# Patient Record
Sex: FEMALE | Race: WHITE | NOT HISPANIC OR LATINO | Employment: OTHER | URBAN - NONMETROPOLITAN AREA
[De-identification: names, ages, dates, MRNs, and addresses within clinical notes are randomized per-mention and may not be internally consistent; named-entity substitution may affect disease eponyms.]

---

## 2023-10-16 ENCOUNTER — OFFICE VISIT (OUTPATIENT)
Dept: URGENT CARE | Facility: CLINIC | Age: 43
End: 2023-10-16
Payer: COMMERCIAL

## 2023-10-16 ENCOUNTER — APPOINTMENT (OUTPATIENT)
Dept: RADIOLOGY | Facility: CLINIC | Age: 43
End: 2023-10-16
Payer: COMMERCIAL

## 2023-10-16 VITALS — RESPIRATION RATE: 18 BRPM | OXYGEN SATURATION: 96 % | TEMPERATURE: 98.8 F | HEART RATE: 101 BPM

## 2023-10-16 DIAGNOSIS — M79.672 LEFT FOOT PAIN: ICD-10-CM

## 2023-10-16 DIAGNOSIS — S90.32XA CONTUSION OF LEFT FOOT, INITIAL ENCOUNTER: Primary | ICD-10-CM

## 2023-10-16 PROCEDURE — 99213 OFFICE O/P EST LOW 20 MIN: CPT | Performed by: PHYSICIAN ASSISTANT

## 2023-10-16 PROCEDURE — 73630 X-RAY EXAM OF FOOT: CPT

## 2023-10-16 RX ORDER — ATORVASTATIN CALCIUM 40 MG/1
1 TABLET, FILM COATED ORAL EVERY EVENING
COMMUNITY
Start: 2023-08-30 | End: 2023-12-28

## 2023-10-16 RX ORDER — LEVOTHYROXINE SODIUM 112 UG/1
TABLET ORAL
COMMUNITY
Start: 2023-08-03

## 2023-10-16 RX ORDER — CLONAZEPAM 1 MG/1
TABLET ORAL
COMMUNITY
Start: 2023-09-01

## 2023-10-16 RX ORDER — ARIPIPRAZOLE 10 MG/1
TABLET ORAL
COMMUNITY
Start: 2023-08-30

## 2023-10-16 RX ORDER — HALOPERIDOL 5 MG/1
TABLET ORAL
COMMUNITY
Start: 2023-08-30

## 2023-10-16 RX ORDER — PANTOPRAZOLE SODIUM 40 MG/1
TABLET, DELAYED RELEASE ORAL
COMMUNITY
Start: 2023-05-25

## 2023-10-16 RX ORDER — PALIPERIDONE PALMITATE 819 MG/2.625ML
INJECTION, SUSPENSION, EXTENDED RELEASE INTRAMUSCULAR
COMMUNITY
Start: 2023-07-28

## 2023-10-16 RX ORDER — GABAPENTIN 300 MG/1
CAPSULE ORAL
COMMUNITY
Start: 2023-09-29

## 2023-10-16 RX ORDER — ASPIRIN 81 MG/1
81 TABLET ORAL DAILY
COMMUNITY

## 2023-10-16 NOTE — PROGRESS NOTES
[facility]        NAME: Samantha Ty is a 39 y.o. female  : 1980    MRN: [id number]  DATE: 2023  TIME: 11:50 AM    Assessment and Plan   Contusion of left foot, initial encounter [S90.32XA]  1. Contusion of left foot, initial encounter  XR foot 3+ vw left        XR provider read: no acute fracture or dislocation    Patient Instructions     Tylenol/Ibuprofen for pain  Wear brace, splint or ACE wrap +/- crutches for support (Remove braces and ACE bandages every 3 hours)  Wear shoe arch support for foot injuries (ex: superfeet insoles)  Ice 20 minutes 3-4 times per day for 3 days  Insulate the skin from the ice to prevent frostbite  Rest and Elevate  Follow up with orthopedic if symptoms do not improve  Follow up with PCP in 3-5 days. Proceed to  ER if symptoms worsen. Remove splint/brace and go straight to ER if you experience sudden increase in pain, swelling, change in color/temperature/sensation, chest pain, shortness or breath, or if you start coughing up blood. Chief Complaint     Chief Complaint   Patient presents with    Pain     Pain swelling with ecchymoses just proximal to left first and second toes onset 3 days ago does not recall what happened         History of Present Illness       Denies known sleep walking. No prior injury to L foot. Ankle Injury   The injury mechanism is unknown. Pain location: L foot. The quality of the pain is described as aching. The pain is moderate. Pertinent negatives include no numbness or tingling. The symptoms are aggravated by palpation. She has tried nothing for the symptoms. Review of Systems   Review of Systems   Musculoskeletal:  Negative for joint swelling. Skin:  Positive for color change. Neurological:  Negative for tingling, weakness and numbness.          Current Medications       Current Outpatient Medications:     ARIPiprazole (ABILIFY) 10 mg tablet, , Disp: , Rfl:     atorvastatin (LIPITOR) 40 mg tablet, Take 1 tablet by mouth every evening, Disp: , Rfl:     Cholecalciferol 25 MCG (1000 UT) tablet, Take 2,000 Units by mouth daily, Disp: , Rfl:     clonazePAM (KlonoPIN) 1 mg tablet, , Disp: , Rfl:     gabapentin (NEURONTIN) 300 mg capsule, Take by mouth, Disp: , Rfl:     haloperidol (HALDOL) 5 mg tablet, , Disp: , Rfl:     Insulin Glargine w/ Trans Port 100 UNIT/ML SOPN, Inject 35 Units under the skin, Disp: , Rfl:     Invega Trinza 819 MG/2.63ML DANY, , Disp: , Rfl:     levothyroxine 112 mcg tablet, Take by mouth, Disp: , Rfl:     metFORMIN (GLUCOPHAGE) 1000 MG tablet, , Disp: , Rfl:     Multiple Vitamins-Minerals (Womens Multivitamin) TABS, Take by mouth, Disp: , Rfl:     pantoprazole (PROTONIX) 40 mg tablet, Take by mouth, Disp: , Rfl:     aspirin (ECOTRIN LOW STRENGTH) 81 mg EC tablet, Take 81 mg by mouth daily, Disp: , Rfl:     Current Allergies     Allergies as of 10/16/2023    (No Known Allergies)            The following portions of the patient's history were reviewed and updated as appropriate: allergies, current medications, past family history, past medical history, past social history, past surgical history and problem list.     Past Medical History:   Diagnosis Date    Anxiety     Depression     Diabetes mellitus ([address])     Disease of thyroid gland        Past Surgical History:   Procedure Laterality Date    TONSILLECTOMY         No family history on file. Medications have been verified. Objective   Pulse 101   Temp 98.8 °F (37.1 °C)   Resp 18   SpO2 96%   No LMP recorded. Physical Exam     Physical Exam  Vitals reviewed. Constitutional:       General: She is not in acute distress. Appearance: She is well-developed. Cardiovascular:      Rate and Rhythm: Normal rate and regular rhythm. Heart sounds: Normal heart sounds. No murmur heard. No friction rub. No gallop. Pulmonary:      Effort: Pulmonary effort is normal. No respiratory distress.       Breath sounds: Normal breath sounds. No wheezing, rhonchi or rales. Musculoskeletal:         General: Tenderness present. No swelling or deformity. Normal range of motion. Comments: L 1-5th proximal to distal metatarsals TTP. Skin:     General: Skin is warm. Findings: Bruising present. No erythema. Comments: Contusion to L 1st-3rd distal metatarsals    Neurological:      Mental Status: She is alert and oriented to person, place, and time. Deep Tendon Reflexes: Reflexes are normal and symmetric. Psychiatric:         Behavior: Behavior normal.         Thought Content:  Thought content normal.         Judgment: Judgment normal.

## 2023-10-16 NOTE — PATIENT INSTRUCTIONS
Tylenol/Ibuprofen for pain  Wear brace, splint or ACE wrap +/- crutches for support (Remove braces and ACE bandages every 3 hours)  Wear shoe arch support for foot injuries (ex: superfeet insoles)  Ice 20 minutes 3-4 times per day for 3 days  Insulate the skin from the ice to prevent frostbite  Rest and Elevate  Follow up with orthopedic if symptoms do not improve  Follow up with PCP in 3-5 days. Proceed to  ER if symptoms worsen. Remove splint/brace and go straight to ER if you experience sudden increase in pain, swelling, change in color/temperature/sensation, chest pain, shortness or breath, or if you start coughing up blood.

## 2024-05-23 ENCOUNTER — OFFICE VISIT (OUTPATIENT)
Dept: URGENT CARE | Facility: CLINIC | Age: 44
End: 2024-05-23
Payer: COMMERCIAL

## 2024-05-23 VITALS
RESPIRATION RATE: 16 BRPM | DIASTOLIC BLOOD PRESSURE: 68 MMHG | TEMPERATURE: 98 F | HEART RATE: 90 BPM | SYSTOLIC BLOOD PRESSURE: 138 MMHG | OXYGEN SATURATION: 97 %

## 2024-05-23 DIAGNOSIS — R21 RASH: ICD-10-CM

## 2024-05-23 DIAGNOSIS — L03.011 PARONYCHIA OF FINGER OF RIGHT HAND: Primary | ICD-10-CM

## 2024-05-23 PROCEDURE — 99213 OFFICE O/P EST LOW 20 MIN: CPT

## 2024-05-23 PROCEDURE — 10060 I&D ABSCESS SIMPLE/SINGLE: CPT

## 2024-05-23 RX ORDER — TRIAMCINOLONE ACETONIDE 1 MG/G
CREAM TOPICAL 2 TIMES DAILY
Qty: 15 G | Refills: 1 | Status: SHIPPED | OUTPATIENT
Start: 2024-05-23

## 2024-05-23 RX ORDER — CEPHALEXIN 500 MG/1
500 CAPSULE ORAL EVERY 6 HOURS SCHEDULED
Qty: 28 CAPSULE | Refills: 0 | Status: SHIPPED | OUTPATIENT
Start: 2024-05-23 | End: 2024-05-30

## 2024-05-23 NOTE — PATIENT INSTRUCTIONS
Take prescribed medication as instructed.  Eat yogurt with live and active cultures and/or take a probiotic at least 3 hours before or after antibiotic dose. Monitor stool for diarrhea and/or blood. If this occurs, contact primary care doctor ASAP.    Tylenol for pain or fever.  Warm compresses 4-5 times daily with a clean washcloth.  Use the prescribed steroid cream as instructed.  Follow-up with dermatology referral as discussed.  Follow up with PCP in 3-5 days.  Proceed to  ER if symptoms worsen.    If tests are performed, our office will contact you with results only if changes need to made to the care plan discussed with you at the visit. You can review your full results on [facility]'s Mychart.  Paronychia   WHAT YOU NEED TO KNOW:   Paronychia is an infection of your nail fold caused by bacteria or a fungus. The nail fold is the skin around your nail. Paronychia may happen suddenly and last for 6 weeks or longer. You may have paronychia on more than 1 finger or toe.  DISCHARGE INSTRUCTIONS:   Return to the emergency department if:   You have severe nail pain.    The inflammation spreads to your hand or arm.    Call your doctor if:   Your nail becomes loose, deformed, or falls off.    You have a large abscess on your nail.    You have questions or concerns about your condition or care.    Medicines:   Td vaccine  is a booster shot used to help prevent tetanus and diphtheria. The Td booster may be given to adolescents and adults every 10 years or for certain wounds and injuries.    Antibiotics:  This medicine will help fight or prevent an infection. It may be given as a pill, cream, or ointment.    Steroids:  This medicine will help decrease inflammation. It may be given as a pill, cream, or ointment.    Antifungal medicine:  This medicine helps kill fungus that may be causing your infection. It may be given as a cream or ointment.    NSAIDs:  These medicines decrease pain and swelling. NSAIDs are available  without a doctor's order. Ask your healthcare provider which medicine is right for you. Ask how much to take and when to take it. Take as directed. NSAIDs can cause stomach bleeding and kidney problems if not taken correctly.    Take your medicine as directed.  Contact your healthcare provider if you think your medicine is not helping or if you have side effects. Tell your provider if you are allergic to any medicine. Keep a list of the medicines, vitamins, and herbs you take. Include the amounts, and when and why you take them. Bring the list or the pill bottles to follow-up visits. Carry your medicine list with you in case of an emergency.    Self-care:   Soak your nail:  Soak your nail in a mixture of equal parts vinegar and water 3 or 4 times each day. This will help decrease inflammation.    Apply a warm compress:  Soak a washcloth in warm water and place it on your nail. This will help decrease inflammation.     Elevate:  Raise your nail above the level of your heart as often as you can. This will help decrease swelling and pain. Prop your nail on pillows or blankets to keep it elevated comfortably.     Use lotion:  Apply lotion after you wash your hands. This will prevent your skin from becoming too dry.    Prevent paronychia:   Avoid chemicals and allergens that may harm your skin and nails. This includes soaps, laundry detergents, and nail products.    Keep your nails clean and dry. Avoid soaking your nails in water. Use cotton-lined rubber gloves or wear 2 rubber gloves if you work with food or water. The gloves will help protect your nail folds.    Keep your nails short. Do not bite your nails, pick at your hangnails, suck your fingers, or wear fake nails. Bring your own nail tools when you go to the nail salon.    Follow up with your doctor as directed:  Write down your questions so you remember to ask them during your visits.  © Copyright [employer] 2023 Information is for End User's use only and may not  be sold, redistributed or otherwise used for commercial purposes.  The above information is an  only. It is not intended as medical advice for individual conditions or treatments. Talk to your doctor, nurse or pharmacist before following any medical regimen to see if it is safe and effective for you.  Acute Rash   WHAT YOU NEED TO KNOW:   A rash is irritated, red, or itchy skin or mucus membranes, such as the lining of your nose or throat. Acute means the rash starts suddenly, worsens quickly, and lasts a short time. Common causes include a disease or infection, a reaction to something you are allergic to, or certain medicines.  DISCHARGE INSTRUCTIONS:   Return to the emergency department if:   You have sudden trouble breathing or chest pain.    You are vomiting, have a headache or muscle aches, and your throat hurts.    Call your doctor or dermatologist if:   You have a fever.    You get open wounds from scratching your skin, or you have a wound that is red, swollen, or painful.    Your rash lasts longer than 3 months.    You have swelling or pain in your joints.    You have questions or concerns about your condition or care.    Medicines:  If your rash does not go away on its own, you may need the following medicines:  Antihistamines  may be given to help decrease itching.    Steroids  may be given to decrease inflammation.    Antibiotics  help fight or prevent a bacterial infection.    Take your medicine as directed.  Contact your healthcare provider if you think your medicine is not helping or if you have side effects. Tell your provider if you are allergic to any medicine. Keep a list of the medicines, vitamins, and herbs you take. Include the amounts, and when and why you take them. Bring the list or the pill bottles to follow-up visits. Carry your medicine list with you in case of an emergency.    Prevent a rash or care for your skin when you have a rash:  Dry skin can lead to more problems. Do  not scratch your skin if it itches. You may cause a skin infection by scratching. The following may prevent dry skin, and help your skin look better:  Help soothe your rash.  Apply thick cream lotions or petroleum jelly. Cool compresses may also soothe your skin. Apply a cool compress or a cool, wet towel, and then cover it with a dry towel.    Use lukewarm water when you bathe.  Hot water may damage your skin more. Pat your skin dry. Do not rub your skin with a towel.    Use detergents, soaps, shampoos, and bubble baths  made for sensitive skin.    Wear clothes made of cotton instead of nylon or wool.  Cotton is softer, so it will not hurt your skin as much.    Follow up with your healthcare providers as directed:  A dermatologist may help find the cause of your rash or help plan or change treatment. A dietitian may help with meal planning if you have a food allergy. Write down your questions so you remember to ask them during your visits.  © Copyright [employer] 2023 Information is for End User's use only and may not be sold, redistributed or otherwise used for commercial purposes.  The above information is an  only. It is not intended as medical advice for individual conditions or treatments. Talk to your doctor, nurse or pharmacist before following any medical regimen to see if it is safe and effective for you.

## 2024-05-23 NOTE — PROGRESS NOTES
[facility]        NAME: Alexandrea Pugh is a 44 y.o. female  : 1980    MRN: [id number]  DATE: May 23, 2024  TIME: 10:08 AM    Assessment and Plan   Paronychia of finger of right hand [L03.011]  1. Paronychia of finger of right hand  cephalexin (KEFLEX) 500 mg capsule      2. Rash  triamcinolone (KENALOG) 0.1 % cream    Ambulatory Referral to Dermatology        Paronychia to right middle finger.  Area was cleaned with Betadine.  Numbing spray used topically and wound was opened using a 22-gauge needle directly into the abscess.  Purulent drainage removed.  Patient tolerated procedure well.  Will start on Keflex.  Also has diffuse rash ongoing for several weeks.  Will give trial of Kenalog.  Given referral for dermatology.  Advise follow-up with family doctor.  Advised to the ER if any symptoms worsen.    Patient Instructions     Take prescribed medication as instructed.  Eat yogurt with live and active cultures and/or take a probiotic at least 3 hours before or after antibiotic dose. Monitor stool for diarrhea and/or blood. If this occurs, contact primary care doctor ASAP.    Tylenol for pain or fever.  Warm compresses 4-5 times daily with a clean washcloth.  Use the prescribed steroid cream as instructed.  Follow-up with dermatology referral as discussed.  Follow up with PCP in 3-5 days.  Proceed to  ER if symptoms worsen.    If tests are performed, our office will contact you with results only if changes need to made to the care plan discussed with you at the visit. [facility]    Chief Complaint     Chief Complaint   Patient presents with    Wound Infection    Rash     Rash started 2 weeks ago on bilateral arms, and PCP prescribed hydrocortisone cream  Right middle finger redness, and edema started 7 days ago           History of Present Illness       44-year-old female presents the clinic with pain, swelling, redness to the right middle finger.   States she noticed it 7 days ago.  Patient  denies any injury to that area.  Patient is a diabetic.  Patient also admits to diffuse rash across her body from the neck down.  Currently using hydrocortisone per family doctor.  Denies dermatology follow-up.  Denies any fever, chills, chest pain, shortness of breath.    Rash        Review of Systems   Review of Systems   Constitutional: Negative.    Respiratory: Negative.     Cardiovascular: Negative.    Skin:  Positive for rash and wound.   Neurological: Negative.          Current Medications       Current Outpatient Medications:     ARIPiprazole (ABILIFY) 10 mg tablet, , Disp: , Rfl:     aspirin (ECOTRIN LOW STRENGTH) 81 mg EC tablet, Take 81 mg by mouth daily, Disp: , Rfl:     cephalexin (KEFLEX) 500 mg capsule, Take 1 capsule (500 mg total) by mouth every 6 (six) hours for 7 days, Disp: 28 capsule, Rfl: 0    Cholecalciferol 25 MCG (1000 UT) tablet, Take 2,000 Units by mouth daily, Disp: , Rfl:     clonazePAM (KlonoPIN) 1 mg tablet, , Disp: , Rfl:     gabapentin (NEURONTIN) 300 mg capsule, Take by mouth, Disp: , Rfl:     haloperidol (HALDOL) 5 mg tablet, , Disp: , Rfl:     Insulin Glargine w/ Trans Port 100 UNIT/ML SOPN, Inject 35 Units under the skin, Disp: , Rfl:     Invega Trinza 819 MG/2.63ML DANY, , Disp: , Rfl:     levothyroxine 112 mcg tablet, Take by mouth, Disp: , Rfl:     metFORMIN (GLUCOPHAGE) 1000 MG tablet, , Disp: , Rfl:     Multiple Vitamins-Minerals (Womens Multivitamin) TABS, Take by mouth, Disp: , Rfl:     pantoprazole (PROTONIX) 40 mg tablet, Take by mouth, Disp: , Rfl:     triamcinolone (KENALOG) 0.1 % cream, Apply topically 2 (two) times a day, Disp: 15 g, Rfl: 1    atorvastatin (LIPITOR) 40 mg tablet, Take 1 tablet by mouth every evening, Disp: , Rfl:     Current Allergies     Allergies as of 05/23/2024    (No Known Allergies)            The following portions of the patient's history were reviewed and updated as appropriate: allergies, current  medications, past family history, past medical history, past social history, past surgical history and problem list.     Past Medical History:   Diagnosis Date    Anxiety     Depression     Diabetes mellitus (HCC)     Disease of thyroid gland        Past Surgical History:   Procedure Laterality Date    TONSILLECTOMY         History reviewed. No pertinent family history.      Medications have been verified.        Objective   /68   Pulse 90   Temp 98 °F (36.7 °C)   Resp 16   SpO2 97%        Physical Exam     Physical Exam  Constitutional:       General: She is not in acute distress.     Appearance: Normal appearance. She is not ill-appearing or diaphoretic.   HENT:      Head: Normocephalic and atraumatic.      Mouth/Throat:      Mouth: Mucous membranes are moist.      Pharynx: Oropharynx is clear.   Cardiovascular:      Rate and Rhythm: Normal rate and regular rhythm.      Pulses: Normal pulses.      Heart sounds: Normal heart sounds.   Pulmonary:      Effort: Pulmonary effort is normal. No respiratory distress.      Breath sounds: Normal breath sounds.   Skin:     General: Skin is warm and dry.      Capillary Refill: Capillary refill takes less than 2 seconds.      Findings: Erythema (Erythema, swelling, tenderness on the right middle finger on the left side of the nail.  Mild visible abscess present containing puslike fluid.  Normal range of motion.  Consistent with paronychia.) and rash (Diffuse erythematous flat rash that does not coalesce on bilateral arms, abdomen, back, legs.  It is not scaling.  There is no vesicles or pustules.  No open wounds or drainage.) present.   Neurological:      Mental Status: She is alert and oriented to person, place, and time.   Psychiatric:         Mood and Affect: Mood normal.       Incision and drain    Date/Time: 5/23/2024 9:00 AM    Performed by: Rich Carreon PA-C  Authorized by: Rich Carreon PA-C  Universal Protocol:  Consent: Verbal consent  "obtained.  Risks and benefits: risks, benefits and alternatives were discussed  Consent given by: patient  Time out: Immediately prior to procedure a \"time out\" was called to verify the correct patient, procedure, equipment, support staff and site/side marked as required.  Timeout called at: 5/23/2024 10:00 AM.  Patient understanding: patient states understanding of the procedure being performed  Patient consent: the patient's understanding of the procedure matches consent given  Required items: required blood products, implants, devices, and special equipment available  Patient identity confirmed: verbally with patient    Patient location:  Clinic  Location:     Indications for incision and drainage: paronychia.    Location:  Upper extremity    Upper extremity location:  R long finger  Pre-procedure details:     Skin preparation:  Betadine  Anesthesia (see MAR for exact dosages):     Anesthesia method:  Topical application  Procedure details:     Complexity:  Simple    Needle aspiration: yes      Needle size:  22 G    Drainage:  Purulent    Drainage amount:  Scant    Wound treatment:  Wound left open  Post-procedure details:     Patient tolerance of procedure:  Tolerated well, no immediate complications               "

## 2024-05-30 ENCOUNTER — TELEPHONE (OUTPATIENT)
Dept: URGENT CARE | Facility: CLINIC | Age: 44
End: 2024-05-30

## 2024-05-30 ENCOUNTER — APPOINTMENT (OUTPATIENT)
Dept: RADIOLOGY | Facility: CLINIC | Age: 44
End: 2024-05-30
Payer: COMMERCIAL

## 2024-05-30 ENCOUNTER — OFFICE VISIT (OUTPATIENT)
Dept: URGENT CARE | Facility: CLINIC | Age: 44
End: 2024-05-30
Payer: COMMERCIAL

## 2024-05-30 VITALS
TEMPERATURE: 98.9 F | DIASTOLIC BLOOD PRESSURE: 70 MMHG | WEIGHT: 267 LBS | BODY MASS INDEX: 44.48 KG/M2 | HEART RATE: 64 BPM | HEIGHT: 65 IN | OXYGEN SATURATION: 99 % | SYSTOLIC BLOOD PRESSURE: 122 MMHG | RESPIRATION RATE: 18 BRPM

## 2024-05-30 DIAGNOSIS — M79.644 PAIN OF FINGER OF RIGHT HAND: ICD-10-CM

## 2024-05-30 DIAGNOSIS — L08.9 FINGER INFECTION: Primary | ICD-10-CM

## 2024-05-30 DIAGNOSIS — L03.011 PARONYCHIA OF FINGER, RIGHT: Primary | ICD-10-CM

## 2024-05-30 PROCEDURE — 99213 OFFICE O/P EST LOW 20 MIN: CPT

## 2024-05-30 PROCEDURE — 73140 X-RAY EXAM OF FINGER(S): CPT

## 2024-05-30 NOTE — PATIENT INSTRUCTIONS
Take prescribed antibiotic as instructed.  It is instructed to be taken every 6 hours daily for 7 days.  Setting alarm on you phone to help remember when to take medication.  Tylenol for pain or fever.  Warm compresses with a clean washcloth 5-6 times daily.  Apply light pressure.  If no improvement, follow-up with your family doctor.  Follow up with PCP in 3-5 days.  Proceed to  ER if symptoms worsen.    If tests are performed, our office will contact you with results only if changes need to made to the care plan discussed with you at the visit. You can review your full results on [facility]'s Mychart.  Paronychia   WHAT YOU NEED TO KNOW:   Paronychia is an infection of your nail fold caused by bacteria or a fungus. The nail fold is the skin around your nail. Paronychia may happen suddenly and last for 6 weeks or longer. You may have paronychia on more than 1 finger or toe.  DISCHARGE INSTRUCTIONS:   Return to the emergency department if:   You have severe nail pain.    The inflammation spreads to your hand or arm.    Call your doctor if:   Your nail becomes loose, deformed, or falls off.    You have a large abscess on your nail.    You have questions or concerns about your condition or care.    Medicines:   Td vaccine  is a booster shot used to help prevent tetanus and diphtheria. The Td booster may be given to adolescents and adults every 10 years or for certain wounds and injuries.    Antibiotics:  This medicine will help fight or prevent an infection. It may be given as a pill, cream, or ointment.    Steroids:  This medicine will help decrease inflammation. It may be given as a pill, cream, or ointment.    Antifungal medicine:  This medicine helps kill fungus that may be causing your infection. It may be given as a cream or ointment.    NSAIDs:  These medicines decrease pain and swelling. NSAIDs are available without a doctor's order. Ask your healthcare provider which medicine is right for you. Ask how much to  take and when to take it. Take as directed. NSAIDs can cause stomach bleeding and kidney problems if not taken correctly.    Take your medicine as directed.  Contact your healthcare provider if you think your medicine is not helping or if you have side effects. Tell your provider if you are allergic to any medicine. Keep a list of the medicines, vitamins, and herbs you take. Include the amounts, and when and why you take them. Bring the list or the pill bottles to follow-up visits. Carry your medicine list with you in case of an emergency.    Self-care:   Soak your nail:  Soak your nail in a mixture of equal parts vinegar and water 3 or 4 times each day. This will help decrease inflammation.    Apply a warm compress:  Soak a washcloth in warm water and place it on your nail. This will help decrease inflammation.     Elevate:  Raise your nail above the level of your heart as often as you can. This will help decrease swelling and pain. Prop your nail on pillows or blankets to keep it elevated comfortably.     Use lotion:  Apply lotion after you wash your hands. This will prevent your skin from becoming too dry.    Prevent paronychia:   Avoid chemicals and allergens that may harm your skin and nails. This includes soaps, laundry detergents, and nail products.    Keep your nails clean and dry. Avoid soaking your nails in water. Use cotton-lined rubber gloves or wear 2 rubber gloves if you work with food or water. The gloves will help protect your nail folds.    Keep your nails short. Do not bite your nails, pick at your hangnails, suck your fingers, or wear fake nails. Bring your own nail tools when you go to the nail salon.    Follow up with your doctor as directed:  Write down your questions so you remember to ask them during your visits.  © Copyright [employer] 2023 Information is for End User's use only and may not be sold, redistributed or otherwise used for commercial purposes.  The above information is an   only. It is not intended as medical advice for individual conditions or treatments. Talk to your doctor, nurse or pharmacist before following any medical regimen to see if it is safe and effective for you.

## 2024-05-30 NOTE — PROGRESS NOTES
[facility]        NAME: Alexandrea Pugh is a 44 y.o. female  : 1980    MRN: [id number]  DATE: May 30, 2024  TIME: 10:27 AM    Assessment and Plan   Paronychia of finger, right [L03.011]  1. Paronychia of finger, right  XR finger right third digit-middle        Patient was here for paronychia 1 week ago.  Was only taking Keflex every 12 hours instead of every 6 hours.  It does look improved.  Very little to no erythema.  No drainage or abscess.  She denies any recent trauma or crushing injury.  No signs of ecchymosis or subungual hematoma.  Nailbed intact.  X-ray of the right third finger does show a small possible bony fragment.  Difficult to determine fracture because of no recent injury.  She continues to deny any injury, trauma, crushing of the finger.  May be an old injury.  Advised to continue with antibiotic.  Advised to follow-up with family doctor.  Advised to go to the ER if any symptoms worsen.    Patient Instructions     Take prescribed antibiotic as instructed.  It is instructed to be taken every 6 hours daily for 7 days.  Setting alarm on you phone to help remember when to take medication.  Tylenol for pain or fever.  Warm compresses with a clean washcloth 5-6 times daily.  Apply light pressure.  If no improvement, follow-up with your family doctor.  Follow up with PCP in 3-5 days.  Proceed to  ER if symptoms worsen.    If tests are performed, our office will contact you with results only if changes need to made to the care plan discussed with you at the visit. [facility]    Chief Complaint     Chief Complaint   Patient presents with    Finger Pain     Right middle finger  swollen around the nail bed , was seen here for the same issue on          History of Present Illness       44-year-old female presents to the clinic for continued pain and swelling to the right third finger.  She was here 1 week ago for paronychia of the finger.   The area was drained using needle aspiration.  Tolerated procedure well at that time.  Started on Keflex every 6 hours.  Patient states that she cannot remember to take the medication that many times a day.  States she is only been taking it twice a day and is still taking at home.  Medication should be finished today.  Denies any new injuries or to the area.  She has been doing warm soaks with Epsom salt.  Denies fever, chills, drainage, chest pain, shortness of breath.        Review of Systems   Review of Systems   Constitutional: Negative.    Respiratory: Negative.     Cardiovascular: Negative.    Skin:  Positive for color change and wound.         Current Medications       Current Outpatient Medications:     ARIPiprazole (ABILIFY) 10 mg tablet, , Disp: , Rfl:     aspirin (ECOTRIN LOW STRENGTH) 81 mg EC tablet, Take 81 mg by mouth daily, Disp: , Rfl:     cephalexin (KEFLEX) 500 mg capsule, Take 1 capsule (500 mg total) by mouth every 6 (six) hours for 7 days, Disp: 28 capsule, Rfl: 0    clonazePAM (KlonoPIN) 1 mg tablet, , Disp: , Rfl:     gabapentin (NEURONTIN) 300 mg capsule, Take by mouth, Disp: , Rfl:     haloperidol (HALDOL) 5 mg tablet, , Disp: , Rfl:     Insulin Glargine w/ Trans Port 100 UNIT/ML SOPN, Inject 35 Units under the skin, Disp: , Rfl:     Invega Trinza 819 MG/2.63ML DANY, , Disp: , Rfl:     levothyroxine 112 mcg tablet, Take by mouth, Disp: , Rfl:     metFORMIN (GLUCOPHAGE) 1000 MG tablet, , Disp: , Rfl:     Multiple Vitamins-Minerals (Womens Multivitamin) TABS, Take by mouth, Disp: , Rfl:     pantoprazole (PROTONIX) 40 mg tablet, Take by mouth, Disp: , Rfl:     triamcinolone (KENALOG) 0.1 % cream, Apply topically 2 (two) times a day, Disp: 15 g, Rfl: 1    atorvastatin (LIPITOR) 40 mg tablet, Take 1 tablet by mouth every evening, Disp: , Rfl:     Cholecalciferol 25 MCG (1000 UT) tablet, Take 2,000 Units by mouth daily, Disp: , Rfl:     Current Allergies     Allergies as of 05/30/2024    (No  "Known Allergies)            The following portions of the patient's history were reviewed and updated as appropriate: allergies, current medications, past family history, past medical history, past social history, past surgical history and problem list.     Past Medical History:   Diagnosis Date    Anxiety     Depression     Diabetes mellitus (HCC)     Disease of thyroid gland        Past Surgical History:   Procedure Laterality Date    TONSILLECTOMY         No family history on file.      Medications have been verified.        Objective   /70   Pulse 64   Temp 98.9 °F (37.2 °C)   Resp 18   Ht 5' 5\" (1.651 m)   Wt 121 kg (267 lb)   SpO2 99%   BMI 44.43 kg/m²        Physical Exam     Physical Exam  Constitutional:       General: She is not in acute distress.     Appearance: Normal appearance. She is not ill-appearing, toxic-appearing or diaphoretic.   HENT:      Head: Normocephalic and atraumatic.      Mouth/Throat:      Mouth: Mucous membranes are moist.      Pharynx: Oropharynx is clear.   Cardiovascular:      Rate and Rhythm: Normal rate and regular rhythm.      Pulses: Normal pulses.      Heart sounds: Normal heart sounds.   Pulmonary:      Effort: Pulmonary effort is normal. No respiratory distress.      Breath sounds: Normal breath sounds.   Skin:     General: Skin is warm and dry.      Capillary Refill: Capillary refill takes less than 2 seconds.      Findings: Erythema (Very minimal erythema with swelling to the distal right third finger.  Does appear improved compared to prior visit.  No abscess or drainage.   normal range of motion.  No damage to nail bed.  Consistent with healing paronychia.) present.   Neurological:      Mental Status: She is alert and oriented to person, place, and time.   Psychiatric:         Mood and Affect: Mood normal.                   "

## 2024-05-30 NOTE — TELEPHONE ENCOUNTER
Called patient regarding x-ray findings that showed mild cortical destruction noted along the radial aspect of the third distal tuft concerning for possible infection.  Patient did have a paronychia which initially drained and started on Keflex.  She was taking it twice daily instead of every 6 hours.  She still has the medication at home and did not completely finish it (it is now 1 week later).  There was little to no erythema of the finger, does appear improved compared to last visit.  No obvious abscess or drainage.  At this time, will start on doxycycline and give referral to orthopedics.  Advised to call PCP tomorrow for follow-up visit.

## 2024-05-31 ENCOUNTER — TELEPHONE (OUTPATIENT)
Age: 44
End: 2024-05-31

## 2024-05-31 NOTE — TELEPHONE ENCOUNTER
Patient is being referred to a orthopedics. Please schedule accordingly.    [facility]   [phone]